# Patient Record
Sex: FEMALE | Race: BLACK OR AFRICAN AMERICAN | NOT HISPANIC OR LATINO | Employment: FULL TIME | URBAN - METROPOLITAN AREA
[De-identification: names, ages, dates, MRNs, and addresses within clinical notes are randomized per-mention and may not be internally consistent; named-entity substitution may affect disease eponyms.]

---

## 2021-07-14 ENCOUNTER — TELEPHONE (OUTPATIENT)
Dept: OBSTETRICS AND GYNECOLOGY | Facility: CLINIC | Age: 46
End: 2021-07-14

## 2021-08-31 DIAGNOSIS — Z12.31 ENCOUNTER FOR SCREENING MAMMOGRAM FOR MALIGNANT NEOPLASM OF BREAST: Primary | ICD-10-CM

## 2021-09-01 RX ORDER — LISDEXAMFETAMINE DIMESYLATE 70 MG/1
70 CAPSULE ORAL EVERY MORNING
COMMUNITY
Start: 2021-08-24

## 2021-09-01 RX ORDER — ALPRAZOLAM 1 MG/1
1 TABLET, EXTENDED RELEASE ORAL 2 TIMES DAILY PRN
COMMUNITY
Start: 2021-08-23 | End: 2022-10-28

## 2021-09-01 RX ORDER — HYDROCODONE BITARTRATE AND ACETAMINOPHEN 7.5; 325 MG/1; MG/1
TABLET ORAL
COMMUNITY
End: 2022-06-14

## 2021-09-01 RX ORDER — PANTOPRAZOLE SODIUM 40 MG/1
40 TABLET, DELAYED RELEASE ORAL DAILY
COMMUNITY
Start: 2021-06-24

## 2021-09-01 RX ORDER — HYDROCHLOROTHIAZIDE 25 MG/1
25 TABLET ORAL DAILY
COMMUNITY
Start: 2021-08-17

## 2021-09-01 RX ORDER — LEVOTHYROXINE SODIUM 112 UG/1
112 TABLET ORAL DAILY
COMMUNITY
Start: 2021-08-23 | End: 2022-10-28 | Stop reason: SDUPTHER

## 2021-09-01 RX ORDER — FERROUS SULFATE, DRIED 160(50) MG
TABLET, EXTENDED RELEASE ORAL
COMMUNITY
End: 2022-10-28

## 2021-09-01 RX ORDER — ATORVASTATIN CALCIUM 40 MG/1
40 TABLET, FILM COATED ORAL NIGHTLY
COMMUNITY
Start: 2021-08-01 | End: 2022-06-14

## 2021-09-07 ENCOUNTER — OFFICE VISIT (OUTPATIENT)
Dept: OBSTETRICS AND GYNECOLOGY | Facility: CLINIC | Age: 46
End: 2021-09-07
Payer: COMMERCIAL

## 2021-09-07 DIAGNOSIS — F90.0 ATTENTION DEFICIT HYPERACTIVITY DISORDER (ADHD), PREDOMINANTLY INATTENTIVE TYPE: ICD-10-CM

## 2021-09-07 DIAGNOSIS — Z71.3 ENCOUNTER FOR WEIGHT LOSS COUNSELING: Primary | ICD-10-CM

## 2021-09-07 DIAGNOSIS — E03.4 HYPOTHYROIDISM DUE TO ACQUIRED ATROPHY OF THYROID: ICD-10-CM

## 2021-09-07 PROBLEM — E03.9 HYPOTHYROIDISM: Status: ACTIVE | Noted: 2021-09-07

## 2021-09-07 PROBLEM — F90.9 ATTENTION DEFICIT HYPERACTIVITY DISORDER (ADHD): Status: ACTIVE | Noted: 2021-09-07

## 2021-09-07 PROCEDURE — 99214 OFFICE O/P EST MOD 30 MIN: CPT | Mod: 95,,, | Performed by: OBSTETRICS & GYNECOLOGY

## 2021-09-07 PROCEDURE — 99214 PR OFFICE/OUTPT VISIT, EST, LEVL IV, 30-39 MIN: ICD-10-PCS | Mod: 95,,, | Performed by: OBSTETRICS & GYNECOLOGY

## 2021-09-10 ENCOUNTER — TELEPHONE (OUTPATIENT)
Dept: OBSTETRICS AND GYNECOLOGY | Facility: CLINIC | Age: 46
End: 2021-09-10

## 2021-09-22 ENCOUNTER — TELEPHONE (OUTPATIENT)
Dept: OBSTETRICS AND GYNECOLOGY | Facility: CLINIC | Age: 46
End: 2021-09-22

## 2021-09-22 DIAGNOSIS — Z71.3 WEIGHT LOSS COUNSELING, ENCOUNTER FOR: Primary | ICD-10-CM

## 2021-09-22 RX ORDER — PHENTERMINE HYDROCHLORIDE 37.5 MG/1
37.5 TABLET ORAL
Qty: 30 TABLET | Refills: 2 | Status: SHIPPED | OUTPATIENT
Start: 2021-09-22 | End: 2021-10-22

## 2022-06-14 ENCOUNTER — OFFICE VISIT (OUTPATIENT)
Dept: NEUROLOGY | Facility: CLINIC | Age: 47
End: 2022-06-14
Payer: COMMERCIAL

## 2022-06-14 VITALS
SYSTOLIC BLOOD PRESSURE: 120 MMHG | HEIGHT: 65 IN | HEART RATE: 79 BPM | WEIGHT: 175 LBS | BODY MASS INDEX: 29.16 KG/M2 | DIASTOLIC BLOOD PRESSURE: 79 MMHG

## 2022-06-14 DIAGNOSIS — R29.818 TRANSIENT NEUROLOGICAL SYMPTOMS: ICD-10-CM

## 2022-06-14 DIAGNOSIS — R42 DYSEQUILIBRIUM: ICD-10-CM

## 2022-06-14 DIAGNOSIS — G43.009 MIGRAINE WITHOUT AURA AND WITHOUT STATUS MIGRAINOSUS, NOT INTRACTABLE: Primary | ICD-10-CM

## 2022-06-14 PROBLEM — M19.90 ARTHRITIS: Status: ACTIVE | Noted: 2022-06-14

## 2022-06-14 PROBLEM — E06.3 HASHIMOTO'S THYROIDITIS: Status: ACTIVE | Noted: 2017-06-19

## 2022-06-14 PROBLEM — D72.819 LEUKOPENIA: Status: ACTIVE | Noted: 2022-06-14

## 2022-06-14 PROBLEM — D12.4 ADENOMA OF DESCENDING COLON: Status: ACTIVE | Noted: 2020-06-25

## 2022-06-14 PROCEDURE — 1159F PR MEDICATION LIST DOCUMENTED IN MEDICAL RECORD: ICD-10-PCS | Mod: CPTII,S$GLB,, | Performed by: PSYCHIATRY & NEUROLOGY

## 2022-06-14 PROCEDURE — 3008F PR BODY MASS INDEX (BMI) DOCUMENTED: ICD-10-PCS | Mod: CPTII,S$GLB,, | Performed by: PSYCHIATRY & NEUROLOGY

## 2022-06-14 PROCEDURE — 99999 PR PBB SHADOW E&M-EST. PATIENT-LVL IV: ICD-10-PCS | Mod: PBBFAC,,, | Performed by: PSYCHIATRY & NEUROLOGY

## 2022-06-14 PROCEDURE — 1159F MED LIST DOCD IN RCRD: CPT | Mod: CPTII,S$GLB,, | Performed by: PSYCHIATRY & NEUROLOGY

## 2022-06-14 PROCEDURE — 3008F BODY MASS INDEX DOCD: CPT | Mod: CPTII,S$GLB,, | Performed by: PSYCHIATRY & NEUROLOGY

## 2022-06-14 PROCEDURE — 99999 PR PBB SHADOW E&M-EST. PATIENT-LVL IV: CPT | Mod: PBBFAC,,, | Performed by: PSYCHIATRY & NEUROLOGY

## 2022-06-14 PROCEDURE — 1160F PR REVIEW ALL MEDS BY PRESCRIBER/CLIN PHARMACIST DOCUMENTED: ICD-10-PCS | Mod: CPTII,S$GLB,, | Performed by: PSYCHIATRY & NEUROLOGY

## 2022-06-14 PROCEDURE — 3074F SYST BP LT 130 MM HG: CPT | Mod: CPTII,S$GLB,, | Performed by: PSYCHIATRY & NEUROLOGY

## 2022-06-14 PROCEDURE — 3078F PR MOST RECENT DIASTOLIC BLOOD PRESSURE < 80 MM HG: ICD-10-PCS | Mod: CPTII,S$GLB,, | Performed by: PSYCHIATRY & NEUROLOGY

## 2022-06-14 PROCEDURE — 99205 OFFICE O/P NEW HI 60 MIN: CPT | Mod: S$GLB,,, | Performed by: PSYCHIATRY & NEUROLOGY

## 2022-06-14 PROCEDURE — 99205 PR OFFICE/OUTPT VISIT, NEW, LEVL V, 60-74 MIN: ICD-10-PCS | Mod: S$GLB,,, | Performed by: PSYCHIATRY & NEUROLOGY

## 2022-06-14 PROCEDURE — 1160F RVW MEDS BY RX/DR IN RCRD: CPT | Mod: CPTII,S$GLB,, | Performed by: PSYCHIATRY & NEUROLOGY

## 2022-06-14 PROCEDURE — 3078F DIAST BP <80 MM HG: CPT | Mod: CPTII,S$GLB,, | Performed by: PSYCHIATRY & NEUROLOGY

## 2022-06-14 PROCEDURE — 3074F PR MOST RECENT SYSTOLIC BLOOD PRESSURE < 130 MM HG: ICD-10-PCS | Mod: CPTII,S$GLB,, | Performed by: PSYCHIATRY & NEUROLOGY

## 2022-06-14 RX ORDER — POTASSIUM CHLORIDE 750 MG/1
TABLET, EXTENDED RELEASE ORAL
COMMUNITY
Start: 2022-01-06

## 2022-06-14 RX ORDER — ALPRAZOLAM 1 MG/1
1 TABLET, EXTENDED RELEASE ORAL
COMMUNITY
Start: 2021-05-12 | End: 2022-10-28

## 2022-06-14 RX ORDER — ZOLPIDEM TARTRATE 10 MG/1
10 TABLET ORAL
COMMUNITY
Start: 2022-01-05

## 2022-06-14 RX ORDER — ASPIRIN 81 MG/1
TABLET ORAL
COMMUNITY
Start: 2022-03-29 | End: 2022-10-28

## 2022-06-14 RX ORDER — BUTALBITAL, ACETAMINOPHEN AND CAFFEINE 50; 325; 40 MG/1; MG/1; MG/1
1 TABLET ORAL EVERY 6 HOURS PRN
Qty: 20 TABLET | Refills: 11 | Status: SHIPPED | OUTPATIENT
Start: 2022-06-14 | End: 2022-07-14

## 2022-06-14 NOTE — PROGRESS NOTES
[facility] NEUROLOGY  [facility], [place]    Date: 6/14/22  Patient Name: Mitzi Vera   MRN: [id number]   PCP: Nina Braswell  Referring Provider: Self, Aaareferral    Chief Complaint:  Facial numbness, neurological imaging concerns  Subjective:        HPI:   Ms. Mitzi Vera is a 47 y.o. female presenting for evaluation of abnormal neurological events in the past.  The patient shares that she underwent surgery on her right shoulder in 2021. The day following the surgery, the patient developed dense numbness in full right face including forehead as well as her neck to approximately the clavicle.  This numbness persisted for greater than 24 hours when she was evaluated in the hospital setting.  Admitted for stroke workup where MRI was reportedly found to be free of acute stroke activity.  She was told at the time that there were white matter changes.  She knows that she was told demyelinating processes could not be ruled out at the time but reports that the neurologist did not seem convinced at the time of the imaging.  Facial sensation changes were present for 3-4 months before resolving very near to previous baseline. The patient did not bring imaging disc for review to today's visit.    Was discharged on aspirin and statin therapies although she says evidence of stroke was not identified on imaging and symptoms persisted greater than 24 hours.  Statin has since been discontinued but she remains on low-dose aspirin.    Since that touched:  Sugar with a suspected stroke her surgery in January 2022. No further episodes of prolonged sensory deficits as described above.    Admits to history of migraines that started in adolescents.  Endorses 10 total headache days per month currently with approximately 5 severe/migrainous days in the last month.  Headaches are usually bilateral and frontal.  7-9/10 pain, pulsating and throbbing.  Routinely last longer than 4 hours.  Sometimes responsive to  Tylenol and Excedrin.  Denies using OTC analgesics more than 10 days per month.  Previously felt topiramate for migraine prevention.  Has used Fioricet in the past successfully to abort migraines.    Also endorses occasional disequilibrium sensation.  Denies greg sensations of self or room spinning.  Often brought on by head movements as she describes episodes of standing up in getting symptoms as well as turning her head to the right and reaching behind her as prompting symptoms.  Episodes can last 10 minutes acutely with milder lingering symptoms for a prolonged time frame afterwards.  Has meclizine at home that she sometimes takes successfully for these episodes.    PAST MEDICAL HISTORY:  Past Medical History:   Diagnosis Date    Acid reflux     ADD (attention deficit disorder)     Anxiety and depression     Chronic tension headaches     Endometriosis     Fibroids     Hyperlipidemia     Hypertension     Hypothyroidism     IBS (irritable bowel syndrome)     Migraines     Sickle cell trait        PAST SURGICAL HISTORY:  Past Surgical History:   Procedure Laterality Date    anal sphincter repair  2009     SECTION      COLONOSCOPY  2020    polyps    CYST REMOVAL Right     thumb    gastric sleeve      TUBAL LIGATION         CURRENT MEDS:  Current Outpatient Medications   Medication Sig Dispense Refill    aspirin (ECOTRIN) 81 MG EC tablet   1 Unknown, Unknown, 0 Refill(s), Soft Stop      calcium carbonate-vitamin D3 600-125 mg-unit Tab Take 2 tablets by mouth once daily.      calcium citrate-vitamin D3 500 mg-12.5 mcg /5 gram PwPk Take by mouth.      calcium-vitamin D3 (OS-JAKE 500 + D3) 500 mg-5 mcg (200 unit) per tablet Calcium 500 + D      hydroCHLOROthiazide (HYDRODIURIL) 25 MG tablet Take 25 mg by mouth once daily.      multivitamin with minerals tablet Take by mouth.      pantoprazole (PROTONIX) 40 MG tablet Take 40 mg by mouth once daily.      potassium chloride (KLOR-CON) 10  MEQ TbSR       SYNTHROID 112 mcg tablet Take 112 mcg by mouth once daily.      VYVANSE 70 mg capsule Take 70 mg by mouth every morning.      zolpidem (AMBIEN) 10 mg Tab 10 mg.      ALPRAZolam (XANAX XR) 1 MG Tb24 Take 1 mg by mouth 2 (two) times daily as needed.      ALPRAZolam (XANAX XR) 1 MG Tb24 1 mg.      butalbital-acetaminophen-caffeine -40 mg (FIORICET, ESGIC) -40 mg per tablet Take 1 tablet by mouth every 6 (six) hours as needed for Headaches. Do not take more than 6 days per month. 20 tablet 11     No current facility-administered medications for this visit.       ALLERGIES:  Review of patient's allergies indicates:   Allergen Reactions    Ace inhibitors Anxiety, Edema, Swelling, Hives, Palpitations and Shortness Of Breath     Whole boy edema  FACIAL SWELLING      Iodinated contrast media Hives and Swelling    Morphine Swelling     Whole body edema    Gadolinium-containing contrast media Itching and Nausea And Vomiting     Contrast agent: Omniscan  Contrast agent: Omniscan      Adhesive Dermatitis and Other (See Comments)     BRANDS HER SKIN      Adhesive tape-silicones Other (See Comments)     Other        FAMILY HISTORY:  Family History   Problem Relation Age of Onset    Heart failure Mother     Diabetes Mother     Hypertension Mother        SOCIAL HISTORY:  Social History     Tobacco Use    Smoking status: Never Smoker    Smokeless tobacco: Never Used   Substance Use Topics    Alcohol use: Yes     Comment: socially    Drug use: Never       Review of Systems:  Gen: no fever, no chills, no generalized feeling of weakness   HEENT: no double vision, no blurred vision, no eye pain, no eye exudates.  Heart: no chest pain, no SOB    Lungs: no SOB, no cough    MSK: no weakness of legs, intact ROM    ABD: no abd pain, no N/V/D/C, no difficulty with defecation.    Extremities: No leg pain, no edema.       Objective:     Vitals:    06/14/22 1504   BP: 120/79   Pulse: 79   Weight: 79.4  "kg (175 lb)   Height: 5' 5" (1.651 m)       General:  Female in NAD, alert and awake, Aox3, well groomed. ?    ? ?    HEENT: Head is NC/AT EOMI, pupil size: 4 mm B/L, several beats of endpoint nystagmus with horizontal gaze; hearing grossly intact b/l. Mucous membrane moist, uvula midline, no pharyngeal erythema, exudates or discharges.      Neck: Supple. no nuchal rigidity.      Cardiovascular: well perfused, no cyanosis        Respiratory: Symmetric chest rise noted       Musculoskeletal: Muscle tone noted to be adequate for patient age, muscle mass is WNL. No spontaneous movements or fasciculations noted during this examination.       Extremities: No pedal edema or calf tenderness. No cogwheel rigidity noted on B/L UE extremities.       Neurological Examination.    Mental status: AA&O x3; Affect/mood is euthymic/congruent; no aphasia noted during examination. Patient answers simple questions appropriately & follows simple commands; no dysarthria or expressive aphasia; no henry-neglect or extinction. Vocabulary/word finding: excellent.       Cranial Nerves: II-XII grossly intact. visual fields intact to confrontation, EOMI, no nystagmus, PERRLA,?facial muscles symmetric and no facial droop noted, patient hearing is grossly intact b/l, ?facial sensation to sharp and light touch grossly intact B/L. Patient smiles, frowns, closes eyes ?forcefully uneventfully. Uvula midline and no difficulty with pronunciation. No SCM/trapezius/muscle of mastication weakness noted B/L. Patient has adequate control of tongue and may protrude it and move it adequately.       Muscle Function: Tone WNL and Muscle bulk WNL.  Some apparent discomfort especially with right shoulder during strength evaluations, but otherwise Full and symmetric strength throughout     Sensory:  Intact to light touch throughout     Reflexes:  2/4 throughout     Coordination: no dysmetria (finger to nose negative)     Gait: adequate casual gait with stride " length and arm swing WNL. Tandem gait very mildly wobbly; walking on toes and heels are WNL     NIHSS: 0      Assessment:   Mitzi Vera is a 47 y.o. female presenting for evaluation of previous neurological event.  It is unclear currently what prompted persistent sensory changes in the right face and neck.  MRI was reportedly normal outside of nonspecific white matter changes and symptoms persisted too long to be classified as transient ischemic attack.  Patient perhaps suffered with temporary neuropathy due to unusual positioning or pressure on specific peripheral nerves during preceding surgical procedure. Patient agreed to provide disc for MRI brain completed during initial hospital encounter and one that was completed several months later.  We will make further evaluations and recommendations specific to that imaging after review.  Patient meets criteria for episodic migraine without aura.  Refill will be provided for Fioricet which the patient is use excessively in the past.  Sudden onset disequilibrium described by the patient is somewhat nonspecific and could be related to frequent migraine activity.  Short lasting intense episodes prompted by head movements would indicate vertigo, but patient denies any debilitating reason episodes.  We agreed to proceed conservatively given multiple brain images in the last year.  She will continue with meclizine and was provided with home exercises to complete for vertigo.    Plan:     Problem List Items Addressed This Visit        Neuro    Migraine without aura and without status migrainosus, not intractable - Primary    Relevant Medications    butalbital-acetaminophen-caffeine -40 mg (FIORICET, ESGIC) -40 mg per tablet    Transient neurological symptoms       Other    Dysequilibrium        - patient was counseled that I would touch base by chart message or phone call after MRI disc received and reviewed.  - if no stroke can be appreciated on imaging that  would align with symptoms described at today's encounter, there is no indication for the patient to continue on aspirin.  Statin has already been discontinued  - if migraine frequency or intensity increases, the patient may benefit from migraine preventive therapy  - Fioricet refill provided  - encourage tight control blood pressure, glucose, cholesterol moving forward for good vascular and brain health  - specific follow-up instructions will be determined after imaging review, the patient was counseled to reach out to our clinic immediately if new neurological concerns arise    I spent a total of 60 minutes on the day of the visit. This includes face to face time and non-face to face time preparing to see the patient (eg, review of tests), obtaining and/or reviewing separately obtained history, documenting clinical information in the electronic or other health record, independently interpreting results and communicating results to the patient/family/caregiver, or care coordinator.    A dictation device was used to produce this document. Use of such devices sometimes results in grammatical errors or replacement of words that sound similarly.    Jaswinder Torres, DO

## 2022-07-01 ENCOUNTER — PATIENT MESSAGE (OUTPATIENT)
Dept: NEUROLOGY | Facility: CLINIC | Age: 47
End: 2022-07-01
Payer: COMMERCIAL

## 2022-10-28 ENCOUNTER — HOSPITAL ENCOUNTER (OUTPATIENT)
Dept: RADIOLOGY | Facility: CLINIC | Age: 47
Discharge: HOME | End: 2022-10-28
Payer: COMMERCIAL

## 2022-10-28 ENCOUNTER — OFFICE VISIT (OUTPATIENT)
Dept: OBSTETRICS AND GYNECOLOGY | Facility: CLINIC | Age: 47
End: 2022-10-28
Payer: COMMERCIAL

## 2022-10-28 VITALS
SYSTOLIC BLOOD PRESSURE: 136 MMHG | BODY MASS INDEX: 28.99 KG/M2 | WEIGHT: 174 LBS | HEIGHT: 65 IN | DIASTOLIC BLOOD PRESSURE: 90 MMHG

## 2022-10-28 DIAGNOSIS — Z12.31 ENCOUNTER FOR SCREENING MAMMOGRAM FOR MALIGNANT NEOPLASM OF BREAST: ICD-10-CM

## 2022-10-28 DIAGNOSIS — Z00.00 ENCOUNTER FOR WELLNESS EXAMINATION: Primary | ICD-10-CM

## 2022-10-28 PROCEDURE — 77063 BREAST TOMOSYNTHESIS BI: CPT | Mod: S$GLB,,, | Performed by: RADIOLOGY

## 2022-10-28 PROCEDURE — 3080F PR MOST RECENT DIASTOLIC BLOOD PRESSURE >= 90 MM HG: ICD-10-PCS | Mod: S$GLB,,, | Performed by: OBSTETRICS & GYNECOLOGY

## 2022-10-28 PROCEDURE — 1159F MED LIST DOCD IN RCRD: CPT | Mod: S$GLB,,, | Performed by: OBSTETRICS & GYNECOLOGY

## 2022-10-28 PROCEDURE — 77067 SCR MAMMO BI INCL CAD: CPT | Mod: S$GLB,,, | Performed by: RADIOLOGY

## 2022-10-28 PROCEDURE — 1160F PR REVIEW ALL MEDS BY PRESCRIBER/CLIN PHARMACIST DOCUMENTED: ICD-10-PCS | Mod: S$GLB,,, | Performed by: OBSTETRICS & GYNECOLOGY

## 2022-10-28 PROCEDURE — 1160F RVW MEDS BY RX/DR IN RCRD: CPT | Mod: S$GLB,,, | Performed by: OBSTETRICS & GYNECOLOGY

## 2022-10-28 PROCEDURE — 3075F PR MOST RECENT SYSTOLIC BLOOD PRESS GE 130-139MM HG: ICD-10-PCS | Mod: S$GLB,,, | Performed by: OBSTETRICS & GYNECOLOGY

## 2022-10-28 PROCEDURE — 99396 PREV VISIT EST AGE 40-64: CPT | Mod: S$GLB,,, | Performed by: OBSTETRICS & GYNECOLOGY

## 2022-10-28 PROCEDURE — 88175 CYTOPATH C/V AUTO FLUID REDO: CPT | Performed by: OBSTETRICS & GYNECOLOGY

## 2022-10-28 PROCEDURE — 77067 MAMMO DIGITAL SCREENING BILAT WITH TOMO: ICD-10-PCS | Mod: S$GLB,,, | Performed by: RADIOLOGY

## 2022-10-28 PROCEDURE — 3080F DIAST BP >= 90 MM HG: CPT | Mod: S$GLB,,, | Performed by: OBSTETRICS & GYNECOLOGY

## 2022-10-28 PROCEDURE — 1159F PR MEDICATION LIST DOCUMENTED IN MEDICAL RECORD: ICD-10-PCS | Mod: S$GLB,,, | Performed by: OBSTETRICS & GYNECOLOGY

## 2022-10-28 PROCEDURE — 3075F SYST BP GE 130 - 139MM HG: CPT | Mod: S$GLB,,, | Performed by: OBSTETRICS & GYNECOLOGY

## 2022-10-28 PROCEDURE — 99396 PR PREVENTIVE VISIT,EST,40-64: ICD-10-PCS | Mod: S$GLB,,, | Performed by: OBSTETRICS & GYNECOLOGY

## 2022-10-28 PROCEDURE — 77063 MAMMO DIGITAL SCREENING BILAT WITH TOMO: ICD-10-PCS | Mod: S$GLB,,, | Performed by: RADIOLOGY

## 2022-10-28 RX ORDER — LANOLIN ALCOHOL/MO/W.PET/CERES
1000 CREAM (GRAM) TOPICAL
COMMUNITY

## 2022-10-28 RX ORDER — VIT C/E/ZN/COPPR/LUTEIN/ZEAXAN 250MG-90MG
1000 CAPSULE ORAL
COMMUNITY

## 2022-10-28 RX ORDER — LEVOTHYROXINE SODIUM 112 UG/1
112 TABLET ORAL
COMMUNITY
Start: 2022-02-16

## 2022-10-28 RX ORDER — FERROUS SULFATE 325(65) MG
325 TABLET ORAL
COMMUNITY

## 2022-10-28 NOTE — PROGRESS NOTES
CC: Well woman exam    Mitzi Vera is a 47 y.o. female  presents for well woman exam.  LMP: Patient's last menstrual period was 10/22/2022 (exact date)..   Patients last pap was .  Last wellness labs were done 10/2022.  Last mammogram was 10/2022.   Primary care is Nina Braswell.  SBE yes  Birth control none.  No issues, problems, or complaints.  The patient is due for wellness exam and mammogram with breast exam and Pap smear and wellness labs today.  No complaints.      Chief Complaint   Patient presents with    Well Woman     Patient is here for annual exam.        Past Medical History:   Diagnosis Date    Acid reflux     ADD (attention deficit disorder)     Amenorrhea 8/15/22    Anxiety and depression     Chronic tension headaches     Endometriosis     Fibroid     Fibroids     Hyperlipidemia     Hypertension     Hypothyroidism     IBS (irritable bowel syndrome)     Migraines     Sickle cell trait      Past Surgical History:   Procedure Laterality Date    ABDOMINAL SURGERY      anal sphincter repair  2009     SECTION      COLONOSCOPY  2020    polyps    CYST REMOVAL Right     thumb    gastric sleeve      HYSTEROSCOPY      TUBAL LIGATION       Social History     Socioeconomic History    Marital status: Single   Tobacco Use    Smoking status: Never    Smokeless tobacco: Never   Substance and Sexual Activity    Alcohol use: Yes     Comment: socially    Drug use: Never    Sexual activity: Yes     Partners: Male     Birth control/protection: See Surgical Hx     Comment: pap-2019   Social History Narrative    ** Merged History Encounter **          Family History   Problem Relation Age of Onset    Heart failure Mother     Diabetes Mother     Hypertension Mother     Breast cancer Neg Hx     Ovarian cancer Neg Hx      OB History          5    Para   2    Term   2       0    AB   3    Living   2         SAB   2    IAB   1    Ectopic   0    Multiple        Live Births   2            "      BP (!) 136/90   Ht 5' 5" (1.651 m)   Wt 78.9 kg (174 lb)   LMP 10/22/2022 (Exact Date)   BMI 28.96 kg/m²       ROS:  GENERAL: Denies weight gain or weight loss. Feeling well overall.   SKIN: Denies rash or lesions.   HEAD: Denies head injury or headache.   NODES: Denies enlarged lymph nodes.   CHEST: Denies chest pain or shortness of breath.   CARDIOVASCULAR: Denies palpitations or left sided chest pain.   ABDOMEN: No abdominal pain, constipation, diarrhea, nausea, vomiting or rectal bleeding.   URINARY: No frequency, dysuria, hematuria, or burning on urination.  REPRODUCTIVE: See HPI.   BREASTS: The patient performs breast self-examination and denies pain, lumps, or nipple discharge.   HEMATOLOGIC: No easy bruisability or excessive bleeding.   MUSCULOSKELETAL: Denies joint pain or swelling.   NEUROLOGIC: Denies syncope or weakness.   PSYCHIATRIC: Denies depression, anxiety or mood swings.    PHYSICAL EXAM:  APPEARANCE: Well nourished, well developed, in no acute distress.  AFFECT: WNL, alert and oriented x 3  SKIN: No acne or hirsutism  NECK: Neck symmetric without masses or thyromegaly  NODES: No inguinal, cervical, or axillary lymph node enlargement  CHEST: Good respiratory effect  ABDOMEN: Soft.  No tenderness or masses.  No hernias.  BREASTS: Symmetrical, no skin changes or visible lesions.  No palpable masses, nipple discharge bilaterally.  PELVIC: Normal external genitalia without lesions.  Normal urethral meatus.  Vagina without lesions or discharge.  Cervix without lesions, discharge or tenderness.  No significant cystocele or rectocele.  Bimanual exam shows uterus to be normal size, regular, mobile and nontender.  Adnexa without masses or tenderness.    EXTREMITIES: No edema.    Encounter for wellness examination  -     Liquid-Based Pap Smear, Screening    Encounter for screening mammogram for malignant neoplasm of breast          Patient was counseled today on A.C.S. Pap guidelines and " recommendations for yearly pelvic exams, mammograms and monthly self breast exams; to see her PCP for other health maintenance.     Follow up in about 1 year (around 10/28/2023) for Wellness and mammogram.

## 2022-11-03 LAB
FINAL PATHOLOGIC DIAGNOSIS: NORMAL
Lab: NORMAL

## 2022-11-07 ENCOUNTER — PATIENT MESSAGE (OUTPATIENT)
Dept: OBSTETRICS AND GYNECOLOGY | Facility: CLINIC | Age: 47
End: 2022-11-07
Payer: COMMERCIAL

## 2022-11-07 DIAGNOSIS — Z71.3 WEIGHT LOSS COUNSELING, ENCOUNTER FOR: Primary | ICD-10-CM

## 2022-11-07 RX ORDER — PHENTERMINE HYDROCHLORIDE 37.5 MG/1
37.5 TABLET ORAL
Qty: 30 TABLET | Refills: 2 | Status: SHIPPED | OUTPATIENT
Start: 2022-11-07 | End: 2022-12-07

## 2023-03-17 ENCOUNTER — PATIENT MESSAGE (OUTPATIENT)
Dept: OBSTETRICS AND GYNECOLOGY | Facility: CLINIC | Age: 48
End: 2023-03-17
Payer: COMMERCIAL

## 2023-03-17 RX ORDER — TRANEXAMIC ACID 650 MG/1
1300 TABLET ORAL 3 TIMES DAILY
Qty: 30 TABLET | Refills: 3 | Status: SHIPPED | OUTPATIENT
Start: 2023-03-17 | End: 2023-03-22

## 2023-06-01 ENCOUNTER — PATIENT MESSAGE (OUTPATIENT)
Dept: OBSTETRICS AND GYNECOLOGY | Facility: CLINIC | Age: 48
End: 2023-06-01
Payer: COMMERCIAL

## 2023-06-01 DIAGNOSIS — N80.9 ENDOMETRIOSIS: ICD-10-CM

## 2023-06-01 DIAGNOSIS — N92.0 MENORRHAGIA WITH REGULAR CYCLE: ICD-10-CM

## 2023-06-01 DIAGNOSIS — R10.2 PELVIC PAIN: ICD-10-CM

## 2023-06-01 DIAGNOSIS — D25.1 INTRAMURAL UTERINE FIBROID: Primary | ICD-10-CM

## 2023-06-01 DIAGNOSIS — N94.6 DYSMENORRHEA: ICD-10-CM

## 2023-06-02 NOTE — TELEPHONE ENCOUNTER
I called the patient and answered her questions.  She is having recurrence of her menorrhagia, dysmenorrhea, pelvic pain, and she thinks that it may be associated with prior diagnosis of endometriosis and fibroids.  She request pelvic ultrasound and has not had a ultrasound in 2-3 years.  We will order those and she needs to schedule a office visit after that ultrasound is done.

## 2023-06-11 ENCOUNTER — PATIENT MESSAGE (OUTPATIENT)
Dept: OBSTETRICS AND GYNECOLOGY | Facility: CLINIC | Age: 48
End: 2023-06-11
Payer: COMMERCIAL

## 2023-07-18 ENCOUNTER — PROCEDURE VISIT (OUTPATIENT)
Dept: OBSTETRICS AND GYNECOLOGY | Facility: CLINIC | Age: 48
End: 2023-07-18
Payer: COMMERCIAL

## 2023-07-18 DIAGNOSIS — D25.1 INTRAMURAL UTERINE FIBROID: ICD-10-CM

## 2023-07-18 DIAGNOSIS — N80.9 ENDOMETRIOSIS: ICD-10-CM

## 2023-07-18 DIAGNOSIS — R10.2 PELVIC PAIN: ICD-10-CM

## 2023-07-18 DIAGNOSIS — N94.6 DYSMENORRHEA: ICD-10-CM

## 2023-07-18 DIAGNOSIS — N92.0 MENORRHAGIA WITH REGULAR CYCLE: ICD-10-CM

## 2023-07-18 PROCEDURE — 76856 US EXAM PELVIC COMPLETE: CPT | Mod: S$GLB,,, | Performed by: OBSTETRICS & GYNECOLOGY

## 2023-07-18 PROCEDURE — 76856 US OB/GYN PROCEDURE (VIEWPOINT): ICD-10-PCS | Mod: S$GLB,,, | Performed by: OBSTETRICS & GYNECOLOGY

## 2023-07-21 ENCOUNTER — OFFICE VISIT (OUTPATIENT)
Dept: OBSTETRICS AND GYNECOLOGY | Facility: CLINIC | Age: 48
End: 2023-07-21
Payer: COMMERCIAL

## 2023-07-21 VITALS
HEART RATE: 72 BPM | WEIGHT: 156.38 LBS | BODY MASS INDEX: 26.05 KG/M2 | HEIGHT: 65 IN | SYSTOLIC BLOOD PRESSURE: 139 MMHG | DIASTOLIC BLOOD PRESSURE: 83 MMHG

## 2023-07-21 DIAGNOSIS — N93.8 DYSFUNCTIONAL UTERINE BLEEDING: Primary | ICD-10-CM

## 2023-07-21 DIAGNOSIS — N92.1 MENORRHAGIA WITH IRREGULAR CYCLE: ICD-10-CM

## 2023-07-21 DIAGNOSIS — D25.1 FIBROIDS, INTRAMURAL: ICD-10-CM

## 2023-07-21 DIAGNOSIS — R10.2 PELVIC PAIN: ICD-10-CM

## 2023-07-21 DIAGNOSIS — N94.6 DYSMENORRHEA: ICD-10-CM

## 2023-07-21 PROBLEM — K59.09 CHRONIC CONSTIPATION: Status: ACTIVE | Noted: 2023-04-18

## 2023-07-21 PROBLEM — G43.709 CHRONIC MIGRAINE WITHOUT AURA: Status: ACTIVE | Noted: 2023-07-21

## 2023-07-21 PROBLEM — F41.9 ANXIETY: Status: ACTIVE | Noted: 2023-07-21

## 2023-07-21 PROBLEM — R94.31 ABNORMAL EKG: Status: ACTIVE | Noted: 2023-07-21

## 2023-07-21 PROBLEM — A64 VENEREAL DISEASE: Status: ACTIVE | Noted: 2023-07-21

## 2023-07-21 PROBLEM — S09.90XA UNSPECIFIED INJURY OF HEAD, INITIAL ENCOUNTER: Status: ACTIVE | Noted: 2023-07-21

## 2023-07-21 PROBLEM — F32.A DEPRESSION: Status: ACTIVE | Noted: 2023-07-21

## 2023-07-21 PROBLEM — M51.37 DDD (DEGENERATIVE DISC DISEASE), LUMBOSACRAL: Status: ACTIVE | Noted: 2023-07-21

## 2023-07-21 PROBLEM — I67.89 CEREBRAL MICROVASCULAR DISEASE: Status: ACTIVE | Noted: 2023-07-21

## 2023-07-21 PROBLEM — Z86.010 HISTORY OF ADENOMATOUS POLYP OF COLON: Status: ACTIVE | Noted: 2023-04-18

## 2023-07-21 PROBLEM — K21.9 GERD (GASTROESOPHAGEAL REFLUX DISEASE): Status: ACTIVE | Noted: 2023-07-21

## 2023-07-21 PROBLEM — E03.9 HYPOTHYROID: Status: ACTIVE | Noted: 2023-07-21

## 2023-07-21 PROCEDURE — 1159F PR MEDICATION LIST DOCUMENTED IN MEDICAL RECORD: ICD-10-PCS | Mod: S$GLB,,, | Performed by: OBSTETRICS & GYNECOLOGY

## 2023-07-21 PROCEDURE — 99214 OFFICE O/P EST MOD 30 MIN: CPT | Mod: S$GLB,,, | Performed by: OBSTETRICS & GYNECOLOGY

## 2023-07-21 PROCEDURE — 1160F PR REVIEW ALL MEDS BY PRESCRIBER/CLIN PHARMACIST DOCUMENTED: ICD-10-PCS | Mod: S$GLB,,, | Performed by: OBSTETRICS & GYNECOLOGY

## 2023-07-21 PROCEDURE — 3008F PR BODY MASS INDEX (BMI) DOCUMENTED: ICD-10-PCS | Mod: S$GLB,,, | Performed by: OBSTETRICS & GYNECOLOGY

## 2023-07-21 PROCEDURE — 3075F SYST BP GE 130 - 139MM HG: CPT | Mod: S$GLB,,, | Performed by: OBSTETRICS & GYNECOLOGY

## 2023-07-21 PROCEDURE — 3079F PR MOST RECENT DIASTOLIC BLOOD PRESSURE 80-89 MM HG: ICD-10-PCS | Mod: S$GLB,,, | Performed by: OBSTETRICS & GYNECOLOGY

## 2023-07-21 PROCEDURE — 3075F PR MOST RECENT SYSTOLIC BLOOD PRESS GE 130-139MM HG: ICD-10-PCS | Mod: S$GLB,,, | Performed by: OBSTETRICS & GYNECOLOGY

## 2023-07-21 PROCEDURE — 3008F BODY MASS INDEX DOCD: CPT | Mod: S$GLB,,, | Performed by: OBSTETRICS & GYNECOLOGY

## 2023-07-21 PROCEDURE — 1160F RVW MEDS BY RX/DR IN RCRD: CPT | Mod: S$GLB,,, | Performed by: OBSTETRICS & GYNECOLOGY

## 2023-07-21 PROCEDURE — 1159F MED LIST DOCD IN RCRD: CPT | Mod: S$GLB,,, | Performed by: OBSTETRICS & GYNECOLOGY

## 2023-07-21 PROCEDURE — 3079F DIAST BP 80-89 MM HG: CPT | Mod: S$GLB,,, | Performed by: OBSTETRICS & GYNECOLOGY

## 2023-07-21 PROCEDURE — 99214 PR OFFICE/OUTPT VISIT, EST, LEVL IV, 30-39 MIN: ICD-10-PCS | Mod: S$GLB,,, | Performed by: OBSTETRICS & GYNECOLOGY

## 2023-07-21 RX ORDER — ALPRAZOLAM 1 MG/1
TABLET, EXTENDED RELEASE ORAL
COMMUNITY
Start: 2022-07-29

## 2023-07-21 RX ORDER — MEDROXYPROGESTERONE ACETATE 150 MG/ML
INJECTION, SUSPENSION INTRAMUSCULAR
Qty: 150 ML | Refills: 4 | Status: SHIPPED | OUTPATIENT
Start: 2023-07-21

## 2023-07-21 NOTE — NURSING
..Patient presents for scheduled Depo-Provera injection.  UPT negative.  Medroxyprogesterone 150 mg IM given per provider order.  See MAR for further details.  Patient tolerated well.  No signs or symptoms of adverse reaction after 15 minutes.  Patient instructed to return for next dose in 3 months.

## 2023-07-21 NOTE — PROGRESS NOTES
Mitzi Vera is a 48 y.o.  who presents today for GYN problem visit.     C/C:   Chief Complaint   Patient presents with    Ultrasound Follow Up       HPI: Has GYN related concerns including Pt coming in to follow up from u/s that was done .  Patient had recent ultrasound that shows multiple uterine fibroids with largest 1 being 3 cm and total uterus size being about 14 week size.  Endometrial Stipe stripe is normal for premenopausal.  Ovaries are normal.  Patient had a heavier than normal menstrual cycle with some significant cramping 2 months ago which improved with this last menstrual cycle.  So she is stable and has multiple medical problems that might need to avoid surgery.  We discussed options and she request a trial of treatment with Depo-Provera at this time.  Yet she Provera shot thank you put the order in the done over did it right    MENSTRUAL HISTORY  Patient's last menstrual period was 2023 (exact date)..      PAP HISTORY: last pap 10/2022,  denies any history of abnormal pap smear        Review of patient's allergies indicates:   Allergen Reactions    Ace inhibitors Anxiety, Edema, Swelling, Hives, Palpitations and Shortness Of Breath     Whole boy edema  FACIAL SWELLING      Contrast media      Other reaction(s): Swelling  facial swelling    Iodinated contrast media Hives and Swelling    Morphine Swelling     Whole body edema    Opioids - morphine analogues Edema, Swelling, Hives, Itching and Rash     Other reaction(s): facial skin peeling    Adhesive Dermatitis and Other (See Comments)     BRANDS HER SKIN    EKG Adhesive leaves a branded bjorn  EKG Adhesive leaves a branded bjorn      Gadolinium-containing contrast media Itching and Nausea And Vomiting     Contrast agent: Omniscan  Contrast agent: Omniscan    Other reaction(s): Itching  Contrast agent: Omniscan    Adhesive tape-silicones Other (See Comments)     Other      Past Medical History:   Diagnosis Date    Acid reflux     ADD  "(attention deficit disorder)     Amenorrhea 8/15/22    Anxiety and depression     Chronic tension headaches     Endometriosis     Fibroid     Fibroids     Hyperlipidemia     Hypertension     Hypothyroidism     IBS (irritable bowel syndrome)     Migraines     Sickle cell trait      Past Surgical History:   Procedure Laterality Date    ABDOMINAL SURGERY      anal sphincter repair  2009     SECTION      COLONOSCOPY  2020    polyps    CYST REMOVAL Right     thumb    gastric sleeve      HYSTEROSCOPY      TUBAL LIGATION       Past Surgical History:   Procedure Laterality Date    ABDOMINAL SURGERY      anal sphincter repair  2009     SECTION      COLONOSCOPY  2020    polyps    CYST REMOVAL Right     thumb    gastric sleeve      HYSTEROSCOPY      TUBAL LIGATION       OB History    Para Term  AB Living   5 2 2 0 3 2   SAB IAB Ectopic Multiple Live Births   2 1 0   2      # Outcome Date GA Lbr Fausto/2nd Weight Sex Delivery Anes PTL Lv   5 Term 97   2.551 kg (5 lb 10 oz) F CS-LTranv Spinal  YAQUELIN   4 Term 89   2.835 kg (6 lb 4 oz) F Vag-Spont EPI  YAQUELIN   3 IAB            2 SAB            1 SAB              OB History          5    Para   2    Term   2       0    AB   3    Living   2         SAB   2    IAB   1    Ectopic   0    Multiple        Live Births   2               Family History   Problem Relation Age of Onset    Heart failure Mother     Diabetes Mother     Hypertension Mother     Breast cancer Neg Hx     Ovarian cancer Neg Hx      Social History     Substance and Sexual Activity   Sexual Activity Yes    Partners: Male    Birth control/protection: See Surgical Hx    Comment: pap-2019       MISHEL Harris  Review of Systems    OBJECTIVE:  /83   Pulse 72   Ht 5' 5" (1.651 m)   Wt 70.9 kg (156 lb 6.4 oz)   LMP 2023 (Exact Date)   BMI 26.03 kg/m²   Physical Exam abdominal exam shows 14 week size fibroid uterus in the suprapubic " area.  Recent ultrasound shows normal ovaries and multiple fibroids.    A:  Enlarged uterus with multiple uterine fibroids, menorrhagia, dysmenorrhea, pelvic pain.  48 y.o. female  for GYN problem visit  Body mass index is 26.03 kg/m².  Patient Active Problem List   Diagnosis    Hypothyroidism    Attention deficit hyperactivity disorder (ADHD)    Leukopenia    Hypertension    Hashimoto's thyroiditis    Arthritis    Asthma    Adenoma of descending colon    Migraine without aura and without status migrainosus, not intractable    Transient neurological symptoms    Dysequilibrium    Abnormal EKG    Anxiety    Cerebral microvascular disease    Chronic constipation    Chronic migraine without aura    DDD (degenerative disc disease), lumbosacral    Depression    Dysfunctional uterine bleeding    History of adenomatous polyp of colon    Unspecified injury of head, initial encounter    Venereal disease    GERD (gastroesophageal reflux disease)    Hypothyroid    Fibroids, intramural    Menorrhagia with irregular cycle    Pelvic pain    Dysmenorrhea         P:  Patient wants to try Depo-Provera for a few months  Dysfunctional uterine bleeding    Fibroids, intramural  -     medroxyPROGESTERone (DEPO-PROVERA) 150 mg/mL Syrg; Every 8-12 weeks  Dispense: 150 mL; Refill: 4    Menorrhagia with irregular cycle  -     medroxyPROGESTERone (DEPO-PROVERA) 150 mg/mL Syrg; Every 8-12 weeks  Dispense: 150 mL; Refill: 4    Pelvic pain    Dysmenorrhea      ----Continue annual exams, return then or sooner prn      No follow-ups on file.

## 2023-11-29 ENCOUNTER — OFFICE VISIT (OUTPATIENT)
Dept: OBSTETRICS AND GYNECOLOGY | Facility: CLINIC | Age: 48
End: 2023-11-29
Payer: COMMERCIAL

## 2023-11-29 VITALS
SYSTOLIC BLOOD PRESSURE: 132 MMHG | HEIGHT: 65 IN | BODY MASS INDEX: 28.19 KG/M2 | HEART RATE: 66 BPM | WEIGHT: 169.19 LBS | DIASTOLIC BLOOD PRESSURE: 84 MMHG

## 2023-11-29 DIAGNOSIS — N92.1 MENORRHAGIA WITH IRREGULAR CYCLE: ICD-10-CM

## 2023-11-29 DIAGNOSIS — Z12.31 ENCOUNTER FOR SCREENING MAMMOGRAM FOR MALIGNANT NEOPLASM OF BREAST: Primary | ICD-10-CM

## 2023-11-29 DIAGNOSIS — D25.1 FIBROIDS, INTRAMURAL: ICD-10-CM

## 2023-11-29 DIAGNOSIS — Z00.00 ENCOUNTER FOR WELLNESS EXAMINATION: ICD-10-CM

## 2023-11-29 DIAGNOSIS — N93.8 DYSFUNCTIONAL UTERINE BLEEDING: ICD-10-CM

## 2023-11-29 PROBLEM — F98.8 ADD (ATTENTION DEFICIT DISORDER) WITHOUT HYPERACTIVITY: Status: ACTIVE | Noted: 2023-11-29

## 2023-11-29 PROCEDURE — 1159F MED LIST DOCD IN RCRD: CPT | Mod: S$GLB,,, | Performed by: OBSTETRICS & GYNECOLOGY

## 2023-11-29 PROCEDURE — 99396 PR PREVENTIVE VISIT,EST,40-64: ICD-10-PCS | Mod: S$GLB,,, | Performed by: OBSTETRICS & GYNECOLOGY

## 2023-11-29 PROCEDURE — 3079F PR MOST RECENT DIASTOLIC BLOOD PRESSURE 80-89 MM HG: ICD-10-PCS | Mod: S$GLB,,, | Performed by: OBSTETRICS & GYNECOLOGY

## 2023-11-29 PROCEDURE — 3075F SYST BP GE 130 - 139MM HG: CPT | Mod: S$GLB,,, | Performed by: OBSTETRICS & GYNECOLOGY

## 2023-11-29 PROCEDURE — 3079F DIAST BP 80-89 MM HG: CPT | Mod: S$GLB,,, | Performed by: OBSTETRICS & GYNECOLOGY

## 2023-11-29 PROCEDURE — 3075F PR MOST RECENT SYSTOLIC BLOOD PRESS GE 130-139MM HG: ICD-10-PCS | Mod: S$GLB,,, | Performed by: OBSTETRICS & GYNECOLOGY

## 2023-11-29 PROCEDURE — 3008F BODY MASS INDEX DOCD: CPT | Mod: S$GLB,,, | Performed by: OBSTETRICS & GYNECOLOGY

## 2023-11-29 PROCEDURE — 3008F PR BODY MASS INDEX (BMI) DOCUMENTED: ICD-10-PCS | Mod: S$GLB,,, | Performed by: OBSTETRICS & GYNECOLOGY

## 2023-11-29 PROCEDURE — 1160F PR REVIEW ALL MEDS BY PRESCRIBER/CLIN PHARMACIST DOCUMENTED: ICD-10-PCS | Mod: S$GLB,,, | Performed by: OBSTETRICS & GYNECOLOGY

## 2023-11-29 PROCEDURE — 99396 PREV VISIT EST AGE 40-64: CPT | Mod: S$GLB,,, | Performed by: OBSTETRICS & GYNECOLOGY

## 2023-11-29 PROCEDURE — 1159F PR MEDICATION LIST DOCUMENTED IN MEDICAL RECORD: ICD-10-PCS | Mod: S$GLB,,, | Performed by: OBSTETRICS & GYNECOLOGY

## 2023-11-29 PROCEDURE — 1160F RVW MEDS BY RX/DR IN RCRD: CPT | Mod: S$GLB,,, | Performed by: OBSTETRICS & GYNECOLOGY

## 2023-11-29 NOTE — PROGRESS NOTES
CC: Well woman exam    Mitzi Vera is a 48 y.o. female  presents for well woman exam.  LMP: Patient's last menstrual period was 2023..   Patients last pap was .  Last wellness labs were done .  Last mammogram was .   Primary care is .  SBE sometimes  Birth control Depo.  No issues, problems, or complaints.  She is on Depo-Provera to try to help shrink her fibroids and apparently it has improved her pelvic pain and stopped her periods so it may be working.  She knows that she may need to follow this up with an ultrasound in the future.  She can continue to have refills for Depo-Provera.  She request order for mammogram today.      Chief Complaint   Patient presents with    Annual Exam        Past Medical History:   Diagnosis Date    Abnormal uterine bleeding     Acid reflux     ADD (attention deficit disorder)     Amenorrhea 8/15/22    Anemia     Anxiety and depression     Chronic tension headaches     Dysmenorrhea     Dyspareunia     Endometriosis     Fibroid     Fibroids     Hyperlipidemia     Hypertension     Hypothyroidism     IBS (irritable bowel syndrome)     Migraines     Sickle cell trait      Past Surgical History:   Procedure Laterality Date    ABDOMINAL SURGERY      anal sphincter repair  2009     SECTION      COLONOSCOPY  2020    polyps    CYST REMOVAL Right     thumb    gastric sleeve      HYSTEROSCOPY      TUBAL LIGATION       Social History     Socioeconomic History    Marital status: Single   Tobacco Use    Smoking status: Never    Smokeless tobacco: Never   Substance and Sexual Activity    Alcohol use: Not Currently     Comment: socially    Drug use: Never    Sexual activity: Not Currently     Partners: Male     Birth control/protection: See Surgical Hx     Comment: pap-2019   Social History Narrative    ** Merged History Encounter **          Family History   Problem Relation Age of Onset    Heart failure Mother     Diabetes Mother     Hypertension  "Mother     Breast cancer Neg Hx     Ovarian cancer Neg Hx      OB History          5    Para   2    Term   2       0    AB   3    Living   2         SAB   2    IAB   1    Ectopic   0    Multiple        Live Births   2                 /84   Pulse 66   Ht 5' 5" (1.651 m)   Wt 76.7 kg (169 lb 3.2 oz)   LMP 2023   BMI 28.16 kg/m²       ROS:  GENERAL: Denies weight gain or weight loss. Feeling well overall.   SKIN: Denies rash or lesions.   HEAD: Denies head injury or headache.   NODES: Denies enlarged lymph nodes.   CHEST: Denies chest pain or shortness of breath.   CARDIOVASCULAR: Denies palpitations or left sided chest pain.   ABDOMEN: No abdominal pain, constipation, diarrhea, nausea, vomiting or rectal bleeding.   URINARY: No frequency, dysuria, hematuria, or burning on urination.  REPRODUCTIVE: See HPI.   BREASTS: Denies pain, lumps, or nipple discharge.   HEMATOLOGIC: No easy bruisability or excessive bleeding.   MUSCULOSKELETAL: Denies joint pain or swelling.   NEUROLOGIC: Denies syncope or weakness.   PSYCHIATRIC: Denies depression, anxiety or mood swings.    PHYSICAL EXAM:  APPEARANCE: Well nourished, well developed, in no acute distress.  AFFECT: WNL, alert and oriented x 3  SKIN: No acne or hirsutism  NECK: Neck symmetric without masses or thyromegaly  NODES: No inguinal, cervical, or axillary lymph node enlargement  CHEST: Good respiratory effect  ABDOMEN: Soft.  No tenderness or masses.  No hernias.  BREASTS: Symmetrical, no skin changes or visible lesions.  No palpable masses, nipple discharge bilaterally.  PELVIC: Normal external genitalia without lesions.  Normal urethral meatus.  Vagina without lesions or discharge.  Cervix without lesions, discharge or tenderness.  No significant cystocele or rectocele.  Bimanual exam shows uterus to be normal size, regular, mobile and nontender.  Adnexa without masses or tenderness.    EXTREMITIES: No edema.    Encounter for screening " mammogram for malignant neoplasm of breast    Encounter for wellness examination    Dysfunctional uterine bleeding    Fibroids, intramural    Menorrhagia with irregular cycle            Patient was counseled today on A.C.S. Pap guidelines and recommendations for yearly pelvic exams, mammograms and monthly self breast exams; to see her PCP for other health maintenance.     Follow up in about 1 year (around 11/29/2024) for Wellness.

## 2023-11-30 ENCOUNTER — PATIENT MESSAGE (OUTPATIENT)
Dept: OBSTETRICS AND GYNECOLOGY | Facility: CLINIC | Age: 48
End: 2023-11-30
Payer: COMMERCIAL

## 2023-11-30 DIAGNOSIS — Z12.31 ENCOUNTER FOR SCREENING MAMMOGRAM FOR BREAST CANCER: Primary | ICD-10-CM
